# Patient Record
Sex: MALE | Race: WHITE | NOT HISPANIC OR LATINO | Employment: OTHER | URBAN - METROPOLITAN AREA
[De-identification: names, ages, dates, MRNs, and addresses within clinical notes are randomized per-mention and may not be internally consistent; named-entity substitution may affect disease eponyms.]

---

## 2018-01-15 ENCOUNTER — ALLSCRIPTS OFFICE VISIT (OUTPATIENT)
Dept: OTHER | Facility: OTHER | Age: 68
End: 2018-01-15

## 2018-01-26 DIAGNOSIS — N20.0 KIDNEY STONES: Primary | ICD-10-CM

## 2018-01-26 NOTE — TELEPHONE ENCOUNTER
Patient called requesting refill on Allopurinol 300mg    Request for same, 90 day supply with 3 refills was generated and forwarded to Dr Fernando Rasmussen for approval

## 2018-01-30 RX ORDER — ALLOPURINOL 300 MG/1
300 TABLET ORAL DAILY
Qty: 100 TABLET | Refills: 2 | Status: SHIPPED | OUTPATIENT
Start: 2018-01-30 | End: 2018-10-04 | Stop reason: SDUPTHER

## 2018-10-04 DIAGNOSIS — N20.0 KIDNEY STONES: ICD-10-CM

## 2018-10-06 RX ORDER — ALLOPURINOL 300 MG/1
TABLET ORAL
Qty: 100 TABLET | Refills: 2 | Status: SHIPPED | OUTPATIENT
Start: 2018-10-06 | End: 2019-07-01 | Stop reason: SDUPTHER

## 2019-01-23 RX ORDER — RANITIDINE 150 MG/1
TABLET ORAL
COMMUNITY

## 2019-01-23 RX ORDER — METOPROLOL TARTRATE 50 MG/1
TABLET, FILM COATED ORAL
COMMUNITY

## 2019-01-23 RX ORDER — METOPROLOL TARTRATE 100 MG/1
TABLET ORAL
COMMUNITY

## 2019-01-23 RX ORDER — LISINOPRIL 30 MG/1
TABLET ORAL
COMMUNITY
End: 2019-01-24 | Stop reason: SDUPTHER

## 2019-01-23 RX ORDER — SIMVASTATIN 20 MG
TABLET ORAL
COMMUNITY
End: 2019-01-24 | Stop reason: ALTCHOICE

## 2019-01-24 ENCOUNTER — OFFICE VISIT (OUTPATIENT)
Dept: UROLOGY | Facility: MEDICAL CENTER | Age: 69
End: 2019-01-24
Payer: COMMERCIAL

## 2019-01-24 VITALS
HEIGHT: 70 IN | BODY MASS INDEX: 27.2 KG/M2 | WEIGHT: 190 LBS | DIASTOLIC BLOOD PRESSURE: 66 MMHG | HEART RATE: 94 BPM | SYSTOLIC BLOOD PRESSURE: 124 MMHG

## 2019-01-24 DIAGNOSIS — N20.0 KIDNEY STONES: Primary | ICD-10-CM

## 2019-01-24 DIAGNOSIS — C61 MALIGNANT NEOPLASM OF PROSTATE (HCC): ICD-10-CM

## 2019-01-24 LAB
SL AMB  POCT GLUCOSE, UA: 1000
SL AMB LEUKOCYTE ESTERASE,UA: ABNORMAL
SL AMB POCT BILIRUBIN,UA: ABNORMAL
SL AMB POCT BLOOD,UA: ABNORMAL
SL AMB POCT CLARITY,UA: CLEAR
SL AMB POCT COLOR,UA: YELLOW
SL AMB POCT KETONES,UA: ABNORMAL
SL AMB POCT NITRITE,UA: ABNORMAL
SL AMB POCT PH,UA: 7
SL AMB POCT SPECIFIC GRAVITY,UA: 1.02
SL AMB POCT URINE PROTEIN: ABNORMAL
SL AMB POCT UROBILINOGEN: 0.2

## 2019-01-24 PROCEDURE — 81003 URINALYSIS AUTO W/O SCOPE: CPT | Performed by: UROLOGY

## 2019-01-24 PROCEDURE — 99214 OFFICE O/P EST MOD 30 MIN: CPT | Performed by: UROLOGY

## 2019-01-24 RX ORDER — LATANOPROST 50 UG/ML
SOLUTION/ DROPS OPHTHALMIC
COMMUNITY
Start: 2018-11-29

## 2019-01-24 RX ORDER — ATORVASTATIN CALCIUM 40 MG/1
TABLET, FILM COATED ORAL
COMMUNITY
Start: 2018-12-20

## 2019-01-24 RX ORDER — LISINOPRIL 2.5 MG/1
TABLET ORAL
COMMUNITY
Start: 2018-12-31

## 2019-01-24 RX ORDER — DIPHENOXYLATE HYDROCHLORIDE AND ATROPINE SULFATE 2.5; .025 MG/1; MG/1
1 TABLET ORAL
COMMUNITY

## 2019-01-24 RX ORDER — CETIRIZINE HYDROCHLORIDE 10 MG/1
10 TABLET ORAL
COMMUNITY

## 2019-01-24 RX ORDER — APIXABAN 5 MG/1
TABLET, FILM COATED ORAL
COMMUNITY
Start: 2019-01-16

## 2019-01-24 NOTE — LETTER
January 24, 2019     [address]  [address]  [place]    Patient: Makeda Landis   YOB: 1950   Date of Visit: 1/24/2019       Dear Dr Valeria Hurley: Thank you for referring Jennifer Martinez to me for evaluation  Below are my notes for this consultation  If you have questions, please do not hesitate to call me  I look forward to following your patient along with you  Sincerely,        Rupert Abdi MD        CC: No Recipients  Rupert Abdi MD  1/24/2019 11:18 AM  Incomplete  Assessment/Plan:    No problem-specific Assessment & Plan notes found for this encounter  Diagnoses and all orders for this visit:    Kidney stones  -     POCT urine dip auto non-scope    Malignant neoplasm of prostate ([facility] [address] )    Other orders  -     aspirin 81 MG tablet; Take by mouth  -     Discontinue: lisinopril (ZESTRIL) 30 mg tablet; Take by mouth  -     metoprolol tartrate (LOPRESSOR) 100 mg tablet; Take by mouth  -     metoprolol tartrate (LOPRESSOR) 50 mg tablet; Take by mouth  -     ranitidine (ZANTAC) 150 mg tablet; Take by mouth  -     Discontinue: simvastatin (ZOCOR) 20 mg tablet; Take by mouth  -     ELIQUIS 5 MG;   -     atorvastatin (LIPITOR) 40 mg tablet;   -     cetirizine (ZyrTEC) 10 mg tablet; Take 10 mg by mouth  -     latanoprost (XALATAN) 0 005 % ophthalmic solution;   -     lisinopril (ZESTRIL) 2 5 mg tablet;   -     multivitamin (THERAGRAN) TABS; Take 1 tablet by mouth          Subjective:      Patient ID: Makeda Landis is a 79 y o  male  HPI  History of prostate cancer:  RRP in Nov 2004 in [place]  Path: [place] 6 in 2 foci, Stage T2c  Since then, he has had no signs or symptoms of recurrent disease  PSA's have been undetectable  PSA on January 15, 2019 was 0 01  He notes nocturia x 1  He denies other significant urinary symptoms  He denies gross hematuria, urinary tract infections or incontinence    He is taking neither medications nor supplements for his symptoms  The following portions of the patient's history were reviewed and updated as appropriate: allergies, current medications, past family history, past medical history, past social history, past surgical history and problem list     Review of Systems   Constitutional: Negative for activity change and fatigue  Respiratory: Negative for shortness of breath and wheezing  Cardiovascular: Negative for chest pain  Hypertension  Atrial fibrillation on Eliquis  Gastrointestinal: Negative for abdominal pain  Genitourinary: Negative for difficulty urinating, dysuria, frequency, hematuria and urgency  Remote hx of stones  Musculoskeletal: Negative for back pain and gait problem  Skin: Negative  Allergic/Immunologic: Negative  Neurological: Negative  Psychiatric/Behavioral: Negative  Objective:      /66 (BP Location: Left arm, Patient Position: Sitting)   Pulse 94   Ht 5' 10" (1 778 m)   Wt 86 2 kg (190 lb)   BMI 27 26 kg/m²           Physical Exam   Constitutional: He is oriented to person, place, and time  He appears well-developed and well-nourished  HENT:   Head: Normocephalic and atraumatic  Eyes: EOM are normal    Neck: Normal range of motion  Neck supple  Pulmonary/Chest: Effort normal    Genitourinary: Rectum normal    Genitourinary Comments: The prostate is surgically absent  There are no nodules in the prostatic bed  Musculoskeletal: Normal range of motion  Neurological: He is alert and oriented to person, place, and time  He has normal reflexes  Skin: Skin is warm and dry  Psychiatric: He has a normal mood and affect   His behavior is normal  Judgment and thought content normal

## 2019-01-24 NOTE — PROGRESS NOTES
Assessment/Plan:    No problem-specific Assessment & Plan notes found for this encounter  Diagnoses and all orders for this visit:    Kidney stones  -     POCT urine dip auto non-scope    Malignant neoplasm of prostate ([facility] [address] )    Other orders  -     aspirin 81 MG tablet; Take by mouth  -     Discontinue: lisinopril (ZESTRIL) 30 mg tablet; Take by mouth  -     metoprolol tartrate (LOPRESSOR) 100 mg tablet; Take by mouth  -     metoprolol tartrate (LOPRESSOR) 50 mg tablet; Take by mouth  -     ranitidine (ZANTAC) 150 mg tablet; Take by mouth  -     Discontinue: simvastatin (ZOCOR) 20 mg tablet; Take by mouth  -     ELIQUIS 5 MG;   -     atorvastatin (LIPITOR) 40 mg tablet;   -     cetirizine (ZyrTEC) 10 mg tablet; Take 10 mg by mouth  -     latanoprost (XALATAN) 0 005 % ophthalmic solution;   -     lisinopril (ZESTRIL) 2 5 mg tablet;   -     multivitamin (THERAGRAN) TABS; Take 1 tablet by mouth          Subjective:      Patient ID: Marc Fonseca is a 79 y o  male  HPI  History of prostate cancer:  RRP in Nov 2004 in [place]  Path: [place] 6 in 2 foci, Stage T2c  Since then, he has had no signs or symptoms of recurrent disease  PSA's have been undetectable  PSA on January 15, 2019 was 0 01  He notes nocturia x 1  He denies other significant urinary symptoms  He denies gross hematuria, urinary tract infections or incontinence  He is taking neither medications nor supplements for his symptoms  The following portions of the patient's history were reviewed and updated as appropriate: allergies, current medications, past family history, past medical history, past social history, past surgical history and problem list     Review of Systems   Constitutional: Negative for activity change and fatigue  Respiratory: Negative for shortness of breath and wheezing  Cardiovascular: Negative for chest pain  Hypertension  Atrial fibrillation on Eliquis  Gastrointestinal: Negative for abdominal pain  Genitourinary: Negative for difficulty urinating, dysuria, frequency, hematuria and urgency  Remote hx of stones  Musculoskeletal: Negative for back pain and gait problem  Skin: Negative  Allergic/Immunologic: Negative  Neurological: Negative  Psychiatric/Behavioral: Negative  Objective:      /66 (BP Location: Left arm, Patient Position: Sitting)   Pulse 94   Ht 5' 10" (1 778 m)   Wt 86 2 kg (190 lb)   BMI 27 26 kg/m²          Physical Exam   Constitutional: He is oriented to person, place, and time  He appears well-developed and well-nourished  HENT:   Head: Normocephalic and atraumatic  Eyes: EOM are normal    Neck: Normal range of motion  Neck supple  Pulmonary/Chest: Effort normal    Genitourinary: Rectum normal    Genitourinary Comments: The prostate is surgically absent  There are no nodules in the prostatic bed  Musculoskeletal: Normal range of motion  Neurological: He is alert and oriented to person, place, and time  He has normal reflexes  Skin: Skin is warm and dry  Psychiatric: He has a normal mood and affect   His behavior is normal  Judgment and thought content normal

## 2019-07-01 DIAGNOSIS — N20.0 KIDNEY STONES: ICD-10-CM

## 2019-07-01 RX ORDER — ALLOPURINOL 300 MG/1
TABLET ORAL
Qty: 100 TABLET | Refills: 2 | Status: SHIPPED | OUTPATIENT
Start: 2019-07-01 | End: 2020-03-27

## 2020-01-30 ENCOUNTER — TELEPHONE (OUTPATIENT)
Dept: UROLOGY | Facility: MEDICAL CENTER | Age: 70
End: 2020-01-30

## 2020-01-30 ENCOUNTER — OFFICE VISIT (OUTPATIENT)
Dept: UROLOGY | Facility: MEDICAL CENTER | Age: 70
End: 2020-01-30
Payer: COMMERCIAL

## 2020-01-30 VITALS
BODY MASS INDEX: 26.92 KG/M2 | SYSTOLIC BLOOD PRESSURE: 140 MMHG | HEIGHT: 70 IN | DIASTOLIC BLOOD PRESSURE: 84 MMHG | HEART RATE: 76 BPM | WEIGHT: 188 LBS

## 2020-01-30 DIAGNOSIS — C61 MALIGNANT NEOPLASM OF PROSTATE (HCC): Primary | ICD-10-CM

## 2020-01-30 DIAGNOSIS — Z85.46 HISTORY OF PROSTATE CANCER: ICD-10-CM

## 2020-01-30 LAB
SL AMB  POCT GLUCOSE, UA: NORMAL
SL AMB LEUKOCYTE ESTERASE,UA: NORMAL
SL AMB POCT BILIRUBIN,UA: NORMAL
SL AMB POCT BLOOD,UA: NORMAL
SL AMB POCT CLARITY,UA: CLEAR
SL AMB POCT COLOR,UA: YELLOW
SL AMB POCT KETONES,UA: NORMAL
SL AMB POCT NITRITE,UA: NORMAL
SL AMB POCT PH,UA: 6
SL AMB POCT SPECIFIC GRAVITY,UA: 1.02
SL AMB POCT URINE PROTEIN: NORMAL
SL AMB POCT UROBILINOGEN: 0.2

## 2020-01-30 PROCEDURE — 81003 URINALYSIS AUTO W/O SCOPE: CPT | Performed by: UROLOGY

## 2020-01-30 PROCEDURE — 99214 OFFICE O/P EST MOD 30 MIN: CPT | Performed by: UROLOGY

## 2020-01-30 RX ORDER — PANTOPRAZOLE SODIUM 20 MG/1
20 TABLET, DELAYED RELEASE ORAL DAILY
COMMUNITY

## 2020-03-27 DIAGNOSIS — N20.0 KIDNEY STONES: ICD-10-CM

## 2020-03-27 RX ORDER — ALLOPURINOL 300 MG/1
TABLET ORAL
Qty: 100 TABLET | Refills: 3 | Status: SHIPPED | OUTPATIENT
Start: 2020-03-27

## 2021-02-03 ENCOUNTER — OFFICE VISIT (OUTPATIENT)
Dept: UROLOGY | Facility: MEDICAL CENTER | Age: 71
End: 2021-02-03
Payer: COMMERCIAL

## 2021-02-03 VITALS
BODY MASS INDEX: 25.77 KG/M2 | SYSTOLIC BLOOD PRESSURE: 120 MMHG | WEIGHT: 180 LBS | HEART RATE: 87 BPM | HEIGHT: 70 IN | DIASTOLIC BLOOD PRESSURE: 76 MMHG

## 2021-02-03 DIAGNOSIS — Z12.5 SPECIAL SCREENING FOR MALIGNANT NEOPLASM OF PROSTATE: ICD-10-CM

## 2021-02-03 DIAGNOSIS — C61 MALIGNANT NEOPLASM OF PROSTATE (HCC): Primary | ICD-10-CM

## 2021-02-03 DIAGNOSIS — N20.0 KIDNEY STONES: ICD-10-CM

## 2021-02-03 LAB
SL AMB  POCT GLUCOSE, UA: ABNORMAL
SL AMB LEUKOCYTE ESTERASE,UA: ABNORMAL
SL AMB POCT BILIRUBIN,UA: ABNORMAL
SL AMB POCT BLOOD,UA: ABNORMAL
SL AMB POCT CLARITY,UA: CLEAR
SL AMB POCT COLOR,UA: YELLOW
SL AMB POCT KETONES,UA: ABNORMAL
SL AMB POCT NITRITE,UA: ABNORMAL
SL AMB POCT PH,UA: 5
SL AMB POCT SPECIFIC GRAVITY,UA: 1.03
SL AMB POCT URINE PROTEIN: ABNORMAL
SL AMB POCT UROBILINOGEN: ABNORMAL

## 2021-02-03 PROCEDURE — 99214 OFFICE O/P EST MOD 30 MIN: CPT | Performed by: UROLOGY

## 2021-02-03 PROCEDURE — 81003 URINALYSIS AUTO W/O SCOPE: CPT | Performed by: UROLOGY

## 2021-02-03 NOTE — PROGRESS NOTES
Assessment/Plan:    History of prostate cancer   The patient is 16 years post prostatectomy  He has a minimal increase in PSA year over year  Diagnoses and all orders for this visit:    Malignant neoplasm of prostate ([facility] [address] )  -     POCT urine dip auto non-scope    Kidney stones  -     POCT urine dip auto non-scope    Special screening for malignant neoplasm of prostate  -     PSA, Total Screen; Future          Subjective:      Patient ID: Inge Potter is a 78 y o  male  HPI  History of prostate cancer:  RRP in Nov 2004 in [place]   Path: [place] 6 in 2 foci, Stage T2c  Since then, he has had no signs or symptoms of recurrent disease   PSA's have been undetectable       ((Pt reports his PSA was 0 1 pre-op  This has not been independently verified  ))       PSA:    0 04 on January 25, 2021    PSA history:   Undetectable through 2016     0 01 on January 9, 2018   0 01 on January 15, 2019  0 02 on January 24, 2020    He notes no significant urinary symptons  He denies other significant urinary symptoms  He denies gross hematuria, urinary tract infections or incontinence  He is taking neither medications nor supplements for his symptoms  The following portions of the patient's history were reviewed and updated as appropriate: allergies, current medications, past family history, past medical history, past social history, past surgical history and problem list     Review of Systems   Constitutional: Negative for activity change and fatigue  Respiratory: Negative for shortness of breath and wheezing  Cardiovascular: Negative for chest pain  Atrial fibrillation on Eliquis  Gastrointestinal: Negative for abdominal pain  Genitourinary: Negative for difficulty urinating, dysuria, frequency, hematuria and urgency  Remote stone hx  Taking allopurinol form   Musculoskeletal: Negative for back pain and gait problem  Skin: Negative  Allergic/Immunologic: Negative      Neurological: Negative  Psychiatric/Behavioral: Negative  Objective:      /76   Pulse 87   Ht 5' 10" (1 778 m)   Wt 81 6 kg (180 lb)   BMI 25 83 kg/m²          Physical Exam  Constitutional:       Appearance: He is well-developed  HENT:      Head: Normocephalic and atraumatic  Neck:      Musculoskeletal: Normal range of motion and neck supple  Pulmonary:      Effort: Pulmonary effort is normal    Genitourinary:     Rectum: Normal       Comments: The prostate has been removed  Musculoskeletal: Normal range of motion  Skin:     General: Skin is warm and dry  Neurological:      Mental Status: He is alert and oriented to person, place, and time  Psychiatric:         Behavior: Behavior normal          Thought Content:  Thought content normal          Judgment: Judgment normal

## 2022-02-08 ENCOUNTER — TELEPHONE (OUTPATIENT)
Dept: UROLOGY | Facility: AMBULATORY SURGERY CENTER | Age: 72
End: 2022-02-08

## 2022-02-08 RX ORDER — METOPROLOL SUCCINATE 100 MG/1
TABLET, EXTENDED RELEASE ORAL
COMMUNITY
Start: 2021-12-10

## 2022-02-11 ENCOUNTER — OFFICE VISIT (OUTPATIENT)
Dept: UROLOGY | Facility: MEDICAL CENTER | Age: 72
End: 2022-02-11
Payer: MEDICARE

## 2022-02-11 VITALS
HEART RATE: 90 BPM | SYSTOLIC BLOOD PRESSURE: 130 MMHG | HEIGHT: 69 IN | BODY MASS INDEX: 27.99 KG/M2 | DIASTOLIC BLOOD PRESSURE: 80 MMHG | WEIGHT: 189 LBS

## 2022-02-11 DIAGNOSIS — C61 MALIGNANT NEOPLASM OF PROSTATE (HCC): Primary | ICD-10-CM

## 2022-02-11 LAB
BACTERIA UR QL AUTO: ABNORMAL /HPF
BILIRUB UR QL STRIP: NEGATIVE
CLARITY UR: CLEAR
COLOR UR: YELLOW
GLUCOSE UR STRIP-MCNC: NEGATIVE MG/DL
HGB UR QL STRIP.AUTO: ABNORMAL
KETONES UR STRIP-MCNC: NEGATIVE MG/DL
LEUKOCYTE ESTERASE UR QL STRIP: NEGATIVE
NITRITE UR QL STRIP: NEGATIVE
NON-SQ EPI CELLS URNS QL MICRO: ABNORMAL /HPF
PH UR STRIP.AUTO: 5.5 [PH]
PROT UR STRIP-MCNC: NEGATIVE MG/DL
RBC #/AREA URNS AUTO: ABNORMAL /HPF
SL AMB  POCT GLUCOSE, UA: ABNORMAL
SL AMB LEUKOCYTE ESTERASE,UA: ABNORMAL
SL AMB POCT BILIRUBIN,UA: ABNORMAL
SL AMB POCT BLOOD,UA: ABNORMAL
SL AMB POCT CLARITY,UA: CLEAR
SL AMB POCT COLOR,UA: YELLOW
SL AMB POCT KETONES,UA: ABNORMAL
SL AMB POCT NITRITE,UA: ABNORMAL
SL AMB POCT PH,UA: 5
SL AMB POCT SPECIFIC GRAVITY,UA: 1.02
SL AMB POCT URINE PROTEIN: ABNORMAL
SL AMB POCT UROBILINOGEN: 0.2
SP GR UR STRIP.AUTO: >=1.03 (ref 1–1.03)
UROBILINOGEN UR QL STRIP.AUTO: 0.2 E.U./DL
WBC #/AREA URNS AUTO: ABNORMAL /HPF

## 2022-02-11 PROCEDURE — 99213 OFFICE O/P EST LOW 20 MIN: CPT | Performed by: NURSE PRACTITIONER

## 2022-02-11 PROCEDURE — 81003 URINALYSIS AUTO W/O SCOPE: CPT | Performed by: NURSE PRACTITIONER

## 2022-02-11 PROCEDURE — 81001 URINALYSIS AUTO W/SCOPE: CPT | Performed by: NURSE PRACTITIONER

## 2022-02-11 NOTE — PROGRESS NOTES
2/11/2022      No chief complaint on file  Assessment and Plan    73 y o  male previously managed by Dr Toby Olguin     1  Prostate cancer  · Status post prostatectomy 2005  · PSA performed 01/31/2022 resulted 0 04  · Will repeat PSA in 1 year    2  Erectile Dysfunction  · Failed PDE 5 inhibitors   · Defers further intervention    History of Present Illness  Robbie Guerra is a 73 y o  male here for follow up evaluation of [place] 6 prostate cancer stage T2c status post prostatectomy 2005  Postoperatively, patient has been doing well with very minimal increase with his PSA  PSA trend below  On evaluation the office today he denies all lower urinary tract symptoms  Patient does report the inability to obtain/maintain an erection suitable for sexual activity  He has been managed on PDE5 inhibitors in the past but reports no significant change  At this point he wishes to defer further treatment  Component      PSA, Total     Component 01/31/2022 01/25/2021 01/24/2020           PSA, Total 0 04 0 04 0 02          Review of Systems   Constitutional: Negative for chills and fever  Respiratory: Negative for cough and shortness of breath  Cardiovascular: Negative for chest pain  Gastrointestinal: Negative for abdominal distention, abdominal pain, blood in stool, nausea and vomiting  Genitourinary: Negative for difficulty urinating, dysuria, enuresis, flank pain, frequency, hematuria and urgency  Skin: Negative for rash  AUA SYMPTOM SCORE      Most Recent Value   AUA SYMPTOM SCORE    How often have you had a sensation of not emptying your bladder completely after you finished urinating? 0   How often have you had to urinate again less than two hours after you finished urinating? 0   How often have you found you stopped and started again several times when you urinate? 0   How often have you found it difficult to postpone urination?  0   How often have you had a weak urinary stream? 0   How often have you had to push or strain to begin urination?  0   How many times did you most typically get up to urinate from the time you went to bed at night until the time you got up in the morning? 0   Quality of Life: If you were to spend the rest of your life with your urinary condition just the way it is now, how would you feel about that? 0   AUA SYMPTOM SCORE 0             Past Medical History  Past Medical History:   Diagnosis Date    Atrial fibrillation ([facility] [address] )     Gastric ulcer     Hypercholesteremia     Hypertension     Kidney stones     [facility]     Ureteral stone        Past Social History  Past Surgical History:   Procedure Laterality Date    CYSTOSCOPY W/ RETROGRADES  ,     CYSTOSCOPY W/ URETERAL STENT REMOVAL      CYSTOSCOPY W/ URETEROSCOPY W/ LITHOTRIPSY      CYSTOSTOMY W/ BLADDER BIOPSY      HERNIA REPAIR      PROSTATECTOMY  2004    VASECTOMY Bilateral      Social History     Tobacco Use   Smoking Status Former Smoker    Years:     Types: Cigarettes    Quit date:     Years since quittin    Smokeless Tobacco Never Used       Past Family History  Family History   Problem Relation Age of Onset    Liver disease Mother         Liver failure    Stroke Father        Past Social history  Social History     Socioeconomic History    Marital status: /Civil Union     Spouse name: Not on file    Number of children: Not on file    Years of education: Not on file    Highest education level: Not on file   Occupational History    Occupation: Supervisor   Tobacco Use    Smoking status: Former Smoker     Years:      Types: Cigarettes     Quit date:      Years since quittin     Smokeless tobacco: Never Used   Substance and Sexual Activity    Alcohol use: No    Drug use: No    Sexual activity: Not on file   Other Topics Concern    Not on file   Social History Narrative    Not on file     Social Determinants of Health     Financial Resource Strain: Not on file   Food Insecurity: Not on file   Transportation Needs: Not on file   Physical Activity: Not on file   Stress: Not on file   Social Connections: Not on file   Intimate Partner Violence: Not on file   Housing Stability: Not on file       Current Medications  Current Outpatient Medications   Medication Sig Dispense Refill    allopurinol (ZYLOPRIM) 300 mg tablet TAKE 1 TABLET DAILY 100 tablet 3    atorvastatin (LIPITOR) 40 mg tablet       cetirizine (ZyrTEC) 10 mg tablet Take 10 mg by mouth      ELIQUIS 5 MG       latanoprost (XALATAN) 0 005 % ophthalmic solution       lisinopril (ZESTRIL) 2 5 mg tablet       metFORMIN (GLUCOPHAGE) 500 mg tablet       metoprolol succinate (TOPROL-XL) 100 mg 24 hr tablet       metoprolol tartrate (LOPRESSOR) 50 mg tablet Take by mouth      multivitamin (THERAGRAN) TABS Take 1 tablet by mouth      pantoprazole (PROTONIX) 20 mg tablet Take 20 mg by mouth daily      aspirin 81 MG tablet Take by mouth      metoprolol tartrate (LOPRESSOR) 100 mg tablet Take by mouth      ranitidine (ZANTAC) 150 mg tablet Take by mouth       No current facility-administered medications for this visit  Allergies  No Known Allergies      The following portions of the patient's history were reviewed and updated as appropriate: allergies, current medications, past medical history, past social history, past surgical history and problem list       Vitals  Vitals:    02/11/22 1510   BP: 130/80   Pulse: 90   Weight: 85 7 kg (189 lb)   Height: 5' 9" (1 753 m)           Physical Exam  Physical Exam  Vitals reviewed  Constitutional:       General: He is not in acute distress  Appearance: Normal appearance  He is normal weight  HENT:      Head: Normocephalic  Pulmonary:      Effort: No respiratory distress  Breath sounds: Normal breath sounds  Skin:     General: Skin is warm and dry     Neurological:      General: No focal deficit present  Mental Status: He is alert and oriented to person, place, and time  Psychiatric:         Mood and Affect: Mood normal          Behavior: Behavior normal            Results  No results found for this or any previous visit (from the past 1 hour(s))  ]  No results found for: PSA  No results found for: GLUCOSE, CALCIUM, NA, K, CO2, CL, BUN, CREATININE  No results found for: WBC, HGB, HCT, MCV, PLT        Orders  Orders Placed This Encounter   Procedures    Urinalysis with microscopic    PSA Total, Diagnostic     Standing Status:   Future     Standing Expiration Date:   2/11/2023    POCT urine dip auto non-scope       WESLEY Machuca

## 2022-10-25 ENCOUNTER — TELEPHONE (OUTPATIENT)
Dept: UROLOGY | Facility: AMBULATORY SURGERY CENTER | Age: 72
End: 2022-10-25

## 2022-10-25 NOTE — TELEPHONE ENCOUNTER
Called patient and advised of results   Advised to continue to monitor and keep upcoming appt in Feb

## 2022-10-25 NOTE — TELEPHONE ENCOUNTER
Patient has a yearly follow up scheduled on 2/23/23 with Randy Benson in [place]  Patient recently was in [facility] for a tick-borne infection and they found stones in his kidney and told him to follow up with his urologist  He is wanting to know if he should be scheduling a sooner appointment  He also stated they are not causing him any issues at the moment      Patient requesting a call back at [phone] or on his cell at [phone]

## 2022-10-25 NOTE — TELEPHONE ENCOUNTER
CT scan 10/18 reviewed  Stable left lower and mid pole calculi   No change needed at this time continue surveillance for february

## 2023-02-15 DIAGNOSIS — C61 MALIGNANT NEOPLASM OF PROSTATE (HCC): Primary | ICD-10-CM

## 2023-02-16 ENCOUNTER — APPOINTMENT (OUTPATIENT)
Age: 73
End: 2023-02-16

## 2023-02-16 LAB — PSA SERPL-MCNC: <0.1 NG/ML (ref 0–4)

## 2023-02-23 ENCOUNTER — OFFICE VISIT (OUTPATIENT)
Dept: UROLOGY | Facility: MEDICAL CENTER | Age: 73
End: 2023-02-23

## 2023-02-23 VITALS
WEIGHT: 193 LBS | SYSTOLIC BLOOD PRESSURE: 140 MMHG | BODY MASS INDEX: 27.63 KG/M2 | HEIGHT: 70 IN | DIASTOLIC BLOOD PRESSURE: 80 MMHG | HEART RATE: 92 BPM

## 2023-02-23 DIAGNOSIS — C61 MALIGNANT NEOPLASM OF PROSTATE (HCC): Primary | ICD-10-CM

## 2023-02-23 DIAGNOSIS — N28.1 RENAL CYST: ICD-10-CM

## 2023-02-23 LAB
SL AMB  POCT GLUCOSE, UA: NORMAL
SL AMB LEUKOCYTE ESTERASE,UA: NORMAL
SL AMB POCT BILIRUBIN,UA: NORMAL
SL AMB POCT BLOOD,UA: NORMAL
SL AMB POCT CLARITY,UA: CLEAR
SL AMB POCT COLOR,UA: YELLOW
SL AMB POCT KETONES,UA: NORMAL
SL AMB POCT NITRITE,UA: NORMAL
SL AMB POCT PH,UA: 5.5
SL AMB POCT SPECIFIC GRAVITY,UA: 1.02
SL AMB POCT URINE PROTEIN: NORMAL
SL AMB POCT UROBILINOGEN: 0.2

## 2023-02-23 NOTE — PATIENT INSTRUCTIONS
CT study next year before next office visit   PSA in 1 year   Call the office for concerns or questions

## 2023-02-23 NOTE — PROGRESS NOTES
2/23/2023      No chief complaint on file  Assessment and Plan    81 y o  male managed by our office    1  Prostate cancer  ? Status post prostatectomy 2005  ? PSA performed 2/16/2023 resulted at less than 0 1  ? Will repeat PSA in 1 year     2  Erectile Dysfunction  ? Failed PDE 5 inhibitors   ? Defers further intervention    History of Present Illness  Carlos Felix is a 81 y o  male here for follow up evaluation of  [place] 6 prostate cancer stage T2c status post prostatectomy 2005  Postoperatively, patient has been doing well with very minimal increase with his PSA  PSA trend below  On evaluation the office today he denies all lower urinary tract symptoms  Patient does report the inability to obtain/maintain an erection suitable for sexual activity  He has been managed on PDE5 inhibitors in the past but reports no significant change  At this point he wishes to defer further treatment  Review of Systems   Constitutional: Negative for chills and fever  Respiratory: Negative for cough and shortness of breath  Cardiovascular: Negative for chest pain  Gastrointestinal: Negative for abdominal distention, abdominal pain, blood in stool, nausea and vomiting  Genitourinary: Negative for difficulty urinating, dysuria, enuresis, flank pain, frequency, hematuria and urgency  Skin: Negative for rash             AUA SYMPTOM SCORE    Flowsheet Row Most Recent Value   AUA SYMPTOM SCORE    How often have you had a sensation of not emptying your bladder completely after you finished urinating? 0 (P)     How often have you had to urinate again less than two hours after you finished urinating? 0 (P)     How often have you found you stopped and started again several times when you urinate? 0 (P)     How often have you found it difficult to postpone urination? 0 (P)     How often have you had a weak urinary stream? 0 (P)     How often have you had to push or strain to begin urination? 0 (P)     How many times did you most typically get up to urinate from the time you went to bed at night until the time you got up in the morning? 1 (P)     Quality of Life: If you were to spend the rest of your life with your urinary condition just the way it is now, how would you feel about that? 0 (P)     AUA SYMPTOM SCORE 1 (P)              Past Medical History  Past Medical History:   Diagnosis Date   • Atrial fibrillation ([facility] )    • Gastric ulcer    • Hypercholesteremia    • Hypertension    • Kidney stones    • Malignant neoplasm prostate ([facility] )    • Ureteral stone        Past Social History  Past Surgical History:   Procedure Laterality Date   • CYSTOSCOPY W/ RETROGRADES  ,    • CYSTOSCOPY W/ URETERAL STENT REMOVAL     • CYSTOSCOPY W/ URETEROSCOPY W/ LITHOTRIPSY     • CYSTOSTOMY W/ BLADDER BIOPSY     • HERNIA REPAIR     • PROSTATECTOMY     • VASECTOMY Bilateral      Social History     Tobacco Use   Smoking Status Former   • Years:    • Types: Cigarettes   • Quit date:    • Years since quittin 1   Smokeless Tobacco Never       Past Family History  Family History   Problem Relation Age of Onset   • Stroke Father    • Liver disease Mother         Liver failure       Past Social history  Social History     Socioeconomic History   • Marital status: /Civil Union     Spouse name: Not on file   • Number of children: Not on file   • Years of education: Not on file   • Highest education level: Not on file   Occupational History   • Occupation: Supervisor   Tobacco Use   • Smoking status: Former     Years:      Types: Cigarettes     Quit date:      Years since quittin 1   • Smokeless tobacco: Never   Substance and Sexual Activity   • Alcohol use: No   • Drug use: No   • Sexual activity: Not on file   Other Topics Concern   • Not on file   Social History Narrative   • Not on file     Social Determinants of Health     Financial Resource Strain: Not on file   Food Insecurity: Not on file "  Transportation Needs: Not on file   Physical Activity: Not on file   Stress: Not on file   Social Connections: Not on file   Intimate Partner Violence: Not on file   Housing Stability: Not on file       Current Medications  Current Outpatient Medications   Medication Sig Dispense Refill   • allopurinol (ZYLOPRIM) 300 mg tablet TAKE 1 TABLET DAILY 100 tablet 3   • aspirin 81 MG tablet Take by mouth     • atorvastatin (LIPITOR) 40 mg tablet      • cetirizine (ZyrTEC) 10 mg tablet Take 10 mg by mouth     • ELIQUIS 5 MG      • latanoprost (XALATAN) 0 005 % ophthalmic solution      • lisinopril (ZESTRIL) 2 5 mg tablet      • metoprolol tartrate (LOPRESSOR) 100 mg tablet Take by mouth     • metoprolol tartrate (LOPRESSOR) 50 mg tablet Take by mouth     • multivitamin (THERAGRAN) TABS Take 1 tablet by mouth     • pantoprazole (PROTONIX) 20 mg tablet Take 20 mg by mouth daily     • ranitidine (ZANTAC) 150 mg tablet Take by mouth     • metoprolol succinate (TOPROL-XL) 100 mg 24 hr tablet  (Patient not taking: Reported on 2/23/2023)       No current facility-administered medications for this visit  Allergies  No Known Allergies      The following portions of the patient's history were reviewed and updated as appropriate: allergies, current medications, past medical history, past social history, past surgical history and problem list       Vitals  Vitals:    02/23/23 1438   BP: 140/80   Pulse: 92   Weight: 87 5 kg (193 lb)   Height: 5' 10\" (1 778 m)           Physical Exam  Physical Exam  Vitals reviewed  Constitutional:       General: He is not in acute distress  Appearance: Normal appearance  He is normal weight  HENT:      Head: Normocephalic  Pulmonary:      Effort: No respiratory distress  Breath sounds: Normal breath sounds  Skin:     General: Skin is warm and dry  Neurological:      General: No focal deficit present  Mental Status: He is alert and oriented to person, place, and time   " Psychiatric:         Mood and Affect: Mood normal          Behavior: Behavior normal            Results  Recent Results (from the past 1 hour(s))   POCT urine dip auto non-scope    Collection Time: 02/23/23  2:41 PM   Result Value Ref Range     COLOR,UA yellow     CLARITY,UA clear     SPECIFIC GRAVITY,UA 1 020      PH,UA 5 5     LEUKOCYTE ESTERASE,UA neg     NITRITE,UA neg     GLUCOSE, UA neg     KETONES,UA neg     BILIRUBIN,UA neg     BLOOD,UA trace-intact     POCT URINE PROTEIN neg     SL AMB POCT UROBILINOGEN 0 2    ]  Lab Results   Component Value Date    PSA <0 1 02/16/2023     No results found for: GLUCOSE, CALCIUM, NA, K, CO2, CL, BUN, CREATININE  No results found for: WBC, HGB, HCT, MCV, PLT        Orders  Orders Placed This Encounter   Procedures   • POCT urine dip auto non-scope       WESLEY Mclaughlin

## 2024-02-19 ENCOUNTER — TELEPHONE (OUTPATIENT)
Dept: UROLOGY | Facility: MEDICAL CENTER | Age: 74
End: 2024-02-19

## 2024-02-19 NOTE — TELEPHONE ENCOUNTER
Call [facility]. Patient is scheduled with  on 02/26/24. Patient needs CT prior to OV. Central scheduling # [phone] provided for scheduling. Office # provided for any questions or concerns

## 2024-02-20 ENCOUNTER — TELEPHONE (OUTPATIENT)
Age: 74
End: 2024-02-20

## 2024-02-20 NOTE — TELEPHONE ENCOUNTER
Pt needs to have a script for the CT since it will be expiring and he won't be able to get it done before his appointment.     Also he wants to know if he should keep him appt due to not having the CT done     CBN:  [phone]

## 2024-02-21 NOTE — TELEPHONE ENCOUNTER
Spoke with patient and he was notified to hold off on a ct scan until he sees Dr. Bazzi on 2/26/24 and  he will determine what's needed at that time. Pt was in agreement with this plan.

## 2024-02-26 ENCOUNTER — OFFICE VISIT (OUTPATIENT)
Dept: UROLOGY | Facility: MEDICAL CENTER | Age: 74
End: 2024-02-26
Payer: MEDICARE

## 2024-02-26 VITALS
OXYGEN SATURATION: 98 % | DIASTOLIC BLOOD PRESSURE: 80 MMHG | SYSTOLIC BLOOD PRESSURE: 110 MMHG | BODY MASS INDEX: 28.29 KG/M2 | WEIGHT: 191 LBS | HEART RATE: 77 BPM | HEIGHT: 69 IN

## 2024-02-26 DIAGNOSIS — N20.0 CALCULUS OF KIDNEY: Primary | ICD-10-CM

## 2024-02-26 DIAGNOSIS — Z85.46 HISTORY OF PROSTATE CANCER: ICD-10-CM

## 2024-02-26 PROCEDURE — 99214 OFFICE O/P EST MOD 30 MIN: CPT | Performed by: UROLOGY

## 2024-02-26 NOTE — PROGRESS NOTES
"   HISTORY:    1.  Follow-up prostate cancer.  Radical prostatectomy in 2005  PSA at the 0 level ever since     January 2024, PSA 0.04    2.  History of kidney stones    Last imaging in October 2022 with CT at [facility] showed numerous kidney stones and renal cyst.         ASSESSMENT / PLAN:    1.  CT to update on size and position of kidney stones    2.  Advice given regarding hydration and stone prevention    3.  It has been 19 years since his radical prostatectomy, very unlikely his change in PSA means any recurrence, continue to check PSA once per year    The following portions of the patient's history were reviewed and updated as appropriate: allergies, current medications, past family history, past medical history, past social history, past surgical history, and problem list.    Review of Systems      Objective:     Physical Exam  Genitourinary:     Comments: Penis testes normal          0   Lab Value Date/Time    PSA <0.1 02/16/2023 0811   ]  BUN   Date Value Ref Range Status   02/12/2024 16 7 - 28 mg/dL Final     Creatinine   Date Value Ref Range Status   02/12/2024 0.82 0.53 - 1.30 mg/dL Final     No components found for: \"CBC\"      Patient Active Problem List   Diagnosis    History of prostate cancer    Malignant neoplasm of prostate (HCC)        Diagnoses and all orders for this visit:    Calculus of kidney  -     CT abdomen pelvis wo contrast; Future    History of prostate cancer           Patient ID: Cory Mercer is a 73 y.o. male.      Current Outpatient Medications:     allopurinol (ZYLOPRIM) 300 mg tablet, TAKE 1 TABLET DAILY, Disp: 100 tablet, Rfl: 3    atorvastatin (LIPITOR) 40 mg tablet, , Disp: , Rfl:     cetirizine (ZyrTEC) 10 mg tablet, Take 10 mg by mouth, Disp: , Rfl:     ELIQUIS 5 MG, , Disp: , Rfl:     latanoprost (XALATAN) 0.005 % ophthalmic solution, , Disp: , Rfl:     lisinopril (ZESTRIL) 2.5 mg tablet, , Disp: , Rfl:     metoprolol tartrate (LOPRESSOR) 100 mg tablet, Take by mouth, Disp: , " Rfl:     metoprolol tartrate (LOPRESSOR) 50 mg tablet, Take by mouth, Disp: , Rfl:     multivitamin (THERAGRAN) TABS, Take 1 tablet by mouth, Disp: , Rfl:     pantoprazole (PROTONIX) 20 mg tablet, Take 20 mg by mouth daily, Disp: , Rfl:     aspirin 81 MG tablet, Take by mouth, Disp: , Rfl:     metoprolol succinate (TOPROL-XL) 100 mg 24 hr tablet, , Disp: , Rfl:     ranitidine (ZANTAC) 150 mg tablet, Take by mouth, Disp: , Rfl:     Past Medical History:   Diagnosis Date    Atrial fibrillation (HCC)     Gastric ulcer     Hypercholesteremia     Hypertension     Kidney stones     Malignant neoplasm prostate (HCC)     Ureteral stone        Past Surgical History:   Procedure Laterality Date    CYSTOSCOPY W/ RETROGRADES  2013, 2014    CYSTOSCOPY W/ URETERAL STENT REMOVAL  2014    CYSTOSCOPY W/ URETEROSCOPY W/ LITHOTRIPSY  2014    CYSTOSTOMY W/ BLADDER BIOPSY  2014    HERNIA REPAIR      PROSTATECTOMY  2004    VASECTOMY Bilateral        Social History

## 2024-02-28 ENCOUNTER — HOSPITAL ENCOUNTER (OUTPATIENT)
Dept: CT IMAGING | Facility: HOSPITAL | Age: 74
Discharge: HOME | End: 2024-02-28
Attending: UROLOGY
Payer: MEDICARE

## 2024-02-28 DIAGNOSIS — N20.0 CALCULUS OF KIDNEY: ICD-10-CM

## 2024-02-28 PROCEDURE — 74176 CT ABD & PELVIS W/O CONTRAST: CPT
